# Patient Record
(demographics unavailable — no encounter records)

---

## 2024-11-05 NOTE — DISCUSSION/SUMMARY
[FreeTextEntry1] : 37 yo p1  high BMI, PCOS , irregular periods, h/o cin1  pap hpv Follow-up with endocrinologist Follow-up with breast surgeon for possible revision Nutritionist follow-up Physical therapy for right shoulder pain

## 2024-11-05 NOTE — HISTORY OF PRESENT ILLNESS
[Patient reported PAP Smear was abnormal] : Patient reported PAP Smear was abnormal [FreeTextEntry1] : 35 yo p1 lmp 10/24 Patient is here for 6- months repeat Pap after colposcopy , with cin1 she is off ocps - she said she was gaining weight on it , had 2x menses  in September , they are irregular ,  trying to fing surgeon to revise her gastric bypass surgery- going to [facility]  she was declined phentermine by her pcp, but her ekg was wnl pt is not working endocrine f/up - for bld wk repeat see if needs higher dose of levothyroxine   past Complains of low libido 11/2022 c/s girl 6 lb iol for preeclampsia/htn [facility] , was on omeprazole- 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

## 2024-12-26 NOTE — PROCEDURE
[Time out performed] : Pre-procedure time out performed.  Patient's name, date of birth and procedure confirmed. [Risks] : risks [Benefits] : benefits [Alternatives] : alternatives [Patient] : patient [Infection] : infection [Bleeding] : bleeding [Allergic Reaction] : allergic reaction [Colposcopy Adequate] : colposcopy adequate [SCI Fully Visualized] : SCI fully visualized [ECC Performed] : ECC performed [No Abnormalities] : no abnormalities [Hemostasis Obtained] : Hemostasis obtained [Tolerated Well] : the patient tolerated the procedure well [de-identified] : hurricaine  [de-identified] : s/p ARAMIS 1  on colpo hr hpv colpo today  bx 10, 12, ecc results in 2 wks

## 2024-12-26 NOTE — PROCEDURE
[Time out performed] : Pre-procedure time out performed.  Patient's name, date of birth and procedure confirmed. [Risks] : risks [Benefits] : benefits [Alternatives] : alternatives [Patient] : patient [Infection] : infection [Bleeding] : bleeding [Allergic Reaction] : allergic reaction [Colposcopy Adequate] : colposcopy adequate [SCI Fully Visualized] : SCI fully visualized [ECC Performed] : ECC performed [No Abnormalities] : no abnormalities [Hemostasis Obtained] : Hemostasis obtained [Tolerated Well] : the patient tolerated the procedure well [de-identified] : hurricaine  [de-identified] : s/p ARAMIS 1  on colpo hr hpv colpo today  bx 10, 12, ecc results in 2 wks

## 2025-07-10 NOTE — PHYSICAL EXAM
[Chaperone Declined] : Chaperone offered however refused by patient, [Appropriately responsive] : appropriately responsive [Alert] : alert [No Acute Distress] : no acute distress [No Lymphadenopathy] : no lymphadenopathy [Soft] : soft [Non-tender] : non-tender [Non-distended] : non-distended [No HSM] : No HSM [No Lesions] : no lesions [No Mass] : no mass [Oriented x3] : oriented x3 [Examination Of The Breasts] : a normal appearance [No Discharge] : no discharge [No Masses] : no breast masses were palpable [Labia Majora] : normal [Labia Minora] : normal [Normal] : normal [Uterine Adnexae] : normal [FreeTextEntry7] : obebse [FreeTextEntry6] : difficult to asses due to body habitus

## 2025-07-10 NOTE — HISTORY OF PRESENT ILLNESS
[Y] : Positive pregnancy history [TextBox_4] : GYNHX No history of fibroids, cysts,  - History of HPV-ARAMIS-1 on colpo 2024 [PGxTotal] : 1 [facility] [FreeTextEntry1] : c/s 6 lb preeclampsia  girl neurofibromatosis 1

## 2025-07-10 NOTE — DISCUSSION/SUMMARY
[FreeTextEntry1] : 37Y old P1 high BMI PCOS irregular periods history of ARAMIS-1,  Pap HPV thyroid panel , hgba1c  patient encouraged to f/up with endocrine levothyroxine 25 mcg q day junel 1.5.30 ocp to start now metformin 850 twice daily if tolerates